# Patient Record
Sex: MALE | Race: BLACK OR AFRICAN AMERICAN
[De-identification: names, ages, dates, MRNs, and addresses within clinical notes are randomized per-mention and may not be internally consistent; named-entity substitution may affect disease eponyms.]

---

## 2020-04-12 ENCOUNTER — HOSPITAL ENCOUNTER (EMERGENCY)
Dept: HOSPITAL 97 - ER | Age: 35
Discharge: HOME | End: 2020-04-12
Payer: COMMERCIAL

## 2020-04-12 VITALS — SYSTOLIC BLOOD PRESSURE: 133 MMHG | OXYGEN SATURATION: 100 % | TEMPERATURE: 98.1 F | DIASTOLIC BLOOD PRESSURE: 72 MMHG

## 2020-04-12 DIAGNOSIS — M54.31: Primary | ICD-10-CM

## 2020-04-12 PROCEDURE — 99282 EMERGENCY DEPT VISIT SF MDM: CPT

## 2020-04-12 NOTE — EDPHYS
Physician Documentation                                                                           

 [facility]                                                                 

Name: Aung Shannon                                                                               

Age: 34 yrs                                                                                       

Sex: Male                                                                                         

: 1985                                                                                   

MRN: [id number]                                                                                   

Arrival Date: 2020                                                                          

Time: 12:37                                                                                       

Account#: [id number]                                                                            

Bed 6                                                                                             

Private MD:                                                                                       

ED Physician Santa Hendricks                                                                    

HPI:                                                                                              

                                                                                             

12:52 This 34 yrs old Black Male presents to ER via Ambulatory with complaints of Back Pain.  kb  

12:52 The patient presents with pain that is acute, with no known mechanism of injury. The    kb  

      symptoms are located in the right low back. Onset: The symptoms/episode began/occurred      

      1 week(s) ago. The pain radiates to the right leg. Associated signs and symptoms: The       

      patient has no apparent associated signs or symptoms. The problem was sustained when        

      lifting. Modifying factors: The patient symptoms are alleviated by nothing, the patient     

      symptoms are aggravated by any movement. Severity of symptoms: At their worst the           

      symptoms were moderate, in the emergency department the symptoms are unchanged. The         

      patient has not experienced similar symptoms in the past. The patient has not recently      

      seen a physician. Pt c/o right low back/buttock pain that radiates down right leg.          

                                                                                                  

Historical:                                                                                       

- Allergies:                                                                                      

12:49 No Known Allergies;                                                                     ll1 

- PMHx:                                                                                           

12:49 seasonal allergies;                                                                     ll1 

- PSHx:                                                                                           

12:49 None;                                                                                   ll1 

                                                                                                  

- Immunization history:: Flu vaccine is not up to date.                                           

- Social history:: Patient uses alcohol, only on a social basis. Patient/guardian                 

  denies using street drugs, tobacco products, Smoking status: Patient denies any                 

  tobacco usage or history of.                                                                    

                                                                                                  

                                                                                                  

ROS:                                                                                              

12:51 Constitutional: Negative for fever, chills, and weight loss, ENT: Negative for injury,  kb  

      pain, and discharge, Neck: Negative for injury, pain, and swelling, Cardiovascular:         

      Negative for chest pain, palpitations, and edema, Respiratory: Negative for shortness       

      of breath, cough, wheezing, and pleuritic chest pain, Abdomen/GI: Negative for              

      abdominal pain, nausea, vomiting, diarrhea, and constipation, : Negative for injury,      

      bleeding, discharge, and swelling, MS/Extremity: Negative for injury and deformity,         

      Skin: Negative for injury, rash, and discoloration, Neuro: Negative for headache,           

      weakness, numbness, tingling, and seizure.                                                  

12:51 Back: Positive for pain at rest, pain with movement, radiated pain, of the right low        

      back.                                                                                       

                                                                                                  

Exam:                                                                                             

12:51 Constitutional:  This is a well developed, well nourished patient who is awake, alert,  kb  

      and in no acute distress. Head/Face:  Normocephalic, atraumatic. Chest/axilla:  Normal      

      chest wall appearance and motion.  Nontender with no deformity.  No lesions are             

      appreciated. Cardiovascular:  Regular rate and rhythm with a normal S1 and S2.  No          

      gallops, murmurs, or rubs.  Normal PMI, no JVD.  No pulse deficits. Respiratory:  Lungs     

      have equal breath sounds bilaterally, clear to auscultation and percussion.  No rales,      

      rhonchi or wheezes noted.  No increased work of breathing, no retractions or nasal          

      flaring. Abdomen/GI:  Soft, non-tender, with normal bowel sounds.  No distension or         

      tympany.  No guarding or rebound.  No evidence of tenderness throughout. Skin:  Warm,       

      dry with normal turgor.  Normal color with no rashes, no lesions, and no evidence of        

      cellulitis. MS/ Extremity:  Pulses equal, no cyanosis.  Neurovascular intact.  Full,        

      normal range of motion. Neuro:  Awake and alert, GCS 15, oriented to person, place,         

      time, and situation.  Cranial nerves II-XII grossly intact.  Motor strength 5/5 in all      

      extremities.  Sensory grossly intact.  Cerebellar exam normal.  Normal gait.                

12:51 Back: pain, that is moderate, of the  right low back, ROM is painful, normal spinal         

      alignment noted.                                                                            

                                                                                                  

Vital Signs:                                                                                      

12:47  / 72; Pulse 58; Resp 17; Temp 98.1; Pulse Ox 100% ; Pain 8/10;                   ll1 

                                                                                                  

MDM:                                                                                              

12:46 Patient medically screened.                                                             kb  

12:50 Data reviewed: vital signs, nurses notes. Data interpreted: Pulse oximetry: on room air kb  

      is 100 %. Interpretation: normal.                                                           

12:52 Counseling: I had a detailed discussion with the patient and/or guardian regarding: the kb  

      historical points, exam findings, and any diagnostic results supporting the                 

      discharge/admit diagnosis, the need for outpatient follow up, a family practitioner, to     

      return to the emergency department if symptoms worsen or persist or if there are any        

      questions or concerns that arise at home.                                                   

                                                                                                  

Administered Medications:                                                                         

13:06 Not Given (Patient Refused): TORadol 30 mg IM once                                      ll1 

                                                                                                  

                                                                                                  

Disposition:                                                                                      

17:24 Co-signature as Attending Physician, Santa Hendricks MD.                               ma2 

                                                                                                  

Disposition:                                                                                      

20 12:53 Discharged to Home. Impression: Sciatica, right side.                              

- Condition is Stable.                                                                            

- Discharge Instructions: Sciatica, Easy-to-Read, Back Exercises, Easy-to-Read.                   

- Prescriptions for Cyclobenzaprine 10 mg Oral Tablet - take 1 tablet by ORAL route               

  every 8 hours As needed; 21 tablet. Diclofenac Sodium 75 mg Oral Tablet, Delayed                

  Release (E.C.) - take 1 tablet by ORAL route 2 times per day As needed; 30 tablet.              

- Medication Reconciliation Form, Thank You Letter, Antibiotic Education, Prescription            

  Opioid Use, Work release form form.                                                             

- Follow up: Emergency Department; When: As needed; Reason: Worsening of condition.               

  Follow up: Private Physician; When: 2 - 3 days; Reason: Recheck today's complaints,             

  Continuance of care, Re-evaluation by your physician.                                           

                                                                                                  

                                                                                                  

                                                                                                  

Signatures:                                                                                       

Mariella Castillo, JEANNEP-C                 FNP-CkSanta Charles MD MD   ma2                                                  

Barry Waters RN                       RN   ll1                                                  

                                                                                                  

Corrections: (The following items were deleted from the chart)                                    

12:52 12:51 Back: Positive for pain at rest, pain with movement, of the right low back, kb      

13:07 12:53 2020 12:53 Discharged to Home. Impression: Sciatica, right side. Condition  ll1 

      is Stable. Forms are Medication Reconciliation Form, Thank You Letter, Antibiotic           

      Education, Prescription Opioid Use. Follow up: Emergency Department; When: As needed;       

      Reason: Worsening of condition. Follow up: Private Physician; When: 2 - 3 days; Reason:     

      Recheck today's complaints, Continuance of care, Re-evaluation by your physician. kb        

                                                                                                  

**************************************************************************************************

## 2020-04-12 NOTE — ER
Nurse's Notes                                                                                     

 [facility]                                                                 

Name: Aung Shannon                                                                               

Age: 34 yrs                                                                                       

Sex: Male                                                                                         

: 1985                                                                                   

MRN: [id number]                                                                                   

Arrival Date: 2020                                                                          

Time: 12:37                                                                                       

Account#: [id number]                                                                            

Bed 6                                                                                             

Private MD:                                                                                       

Diagnosis: Sciatica, right side                                                                   

                                                                                                  

Presentation:                                                                                     

                                                                                             

12:47 Chief complaint: Patient states: Low back pain for 7-10 days. Lifts heavy objects at    1 

      work, but doesn't remember a specific trauma or injury. Pain radiates down right leg at     

      times. No cough/fever. Coronavirus screen: Proceed with normal triage. Patient denies a     

      cough. Patient denies shortness of breath or difficulty breathing. Patient denies           

      measured and/or subjective temperature greater than 100.4F prior to today's visit.          

      Patient denies travel on a cruise ship or to a country the [facility] currently lists as an        

      affected area. Patient denies contact with known and/or suspected case of COVID-19.         

      Ebola Screen: Patient denies travel to an Ebola-affected area in the 21 days before         

      illness onset. Initial Sepsis Screen: Does the patient meet any 2 criteria? No.             

      Patient's initial sepsis screen is negative. Does the patient have a suspected source       

      of infection? No. Patient's initial sepsis screen is negative. Risk Assessment: Do you      

      want to hurt yourself or someone else? Patient reports no desire to harm self or            

      others. Onset of symptoms was 2020.                                               

12:47 Method Of Arrival: Ambulatory                                                           ll1 

12:47 Acuity: MEAGHAN 4                                                                           ll1 

                                                                                                  

Historical:                                                                                       

- Allergies:                                                                                      

12:49 No Known Allergies;                                                                     ll1 

- PMHx:                                                                                           

12:49 seasonal allergies;                                                                     ll1 

- PSHx:                                                                                           

12:49 None;                                                                                   ll1 

                                                                                                  

- Immunization history:: Flu vaccine is not up to date.                                           

- Social history:: Patient uses alcohol, only on a social basis. Patient/guardian                 

  denies using street drugs, tobacco products, Smoking status: Patient denies any                 

  tobacco usage or history of.                                                                    

                                                                                                  

                                                                                                  

Screenin:05 Abuse screen: Denies threats or abuse. Nutritional screening: No deficits noted.        ll1 

      Tuberculosis screening: No symptoms or risk factors identified. Fall Risk None              

      identified. No fall in past 12 months (0 pts). Total Riley Fall Scale indicates No Risk     

      (0-24 pts).                                                                                 

                                                                                                  

Assessment:                                                                                       

13:03 General: Appears in no apparent distress. Behavior is calm, cooperative, appropriate    ll1 

      for age. Pain: Complains of pain in right low back Pain radiates to right leg Pain          

      currently is 8 out of 10 on a pain scale. Pain began 7-10 days Is intermittent,             

      Alleviated by rest. Neuro: No deficits noted. Level of Consciousness is awake, alert,       

      obeys commands, Oriented to person, place, time, situation,  are equal bilaterally     

      Moves all extremities. Full function Gait is steady, Speech is normal, Facial symmetry      

      appears normal, Reports low back pain that radiates into right leg. Cardiovascular: No      

      deficits noted. Respiratory: No deficits noted. GI: No deficits noted. Musculoskeletal:     

      Circulation, motion, and sensation intact. Capillary refill < 3 seconds, Range of           

      motion: intact in all extremities, Reports pain in right leg and right low back Denies      

      weakness in right leg numbness in, right leg. Injury Description: no specific injury        

      remembered.                                                                                 

                                                                                                  

Vital Signs:                                                                                      

12:47  / 72; Pulse 58; Resp 17; Temp 98.1; Pulse Ox 100% ; Pain 8/10;                   ll1 

                                                                                                  

ED Course:                                                                                        

12:37 Patient arrived in ED.                                                                  mr  

12:39 Mariella [facility]illo FNP-C is [facility]                                                        kb  

12:39 Santa Hendricks MD is Attending Physician.                                           kb  

12:49 Triage completed.                                                                       ll1 

12:49 Arm band placed on Patient placed in an exam room, on a stretcher.                      ll1 

13:06 Patient has correct armband on for positive identification. Bed in low position. Call   ll1 

      light in reach. Side rails up X 1.                                                          

13:06 No provider procedures requiring assistance completed. Patient did not have IV access   ll1 

      during this emergency room visit.                                                           

                                                                                                  

Administered Medications:                                                                         

13:06 Not Given (Patient Refused): TORadol 30 mg IM once                                      ll1 

                                                                                                  

                                                                                                  

Outcome:                                                                                          

12:53 Discharge ordered by MD.                                                                kb  

13:06 Discharged to home ambulatory.                                                          ll1 

13:06 Condition: stable                                                                           

13:06 Discharge instructions given to patient, Instructed on discharge instructions, follow       

      up and referral plans. medication usage, Demonstrated understanding of instructions,        

      follow-up care, medications, Prescriptions given X 2.                                       

13:07 Patient left the ED.                                                                    ll1 

                                                                                                  

Signatures:                                                                                       

Mariella Castillo FNP-C FNP-Isabel Triana                                                                                    

Barry Waters RN                       RN   ll1                                                  

                                                                                                  

**************************************************************************************************